# Patient Record
Sex: FEMALE | URBAN - METROPOLITAN AREA
[De-identification: names, ages, dates, MRNs, and addresses within clinical notes are randomized per-mention and may not be internally consistent; named-entity substitution may affect disease eponyms.]

---

## 2018-03-19 ENCOUNTER — HOSPITAL ENCOUNTER (OUTPATIENT)
Facility: CLINIC | Age: 38
End: 2018-03-19
Attending: OBSTETRICS & GYNECOLOGY | Admitting: OBSTETRICS & GYNECOLOGY
Payer: COMMERCIAL

## 2022-09-20 ENCOUNTER — HOSPITAL ENCOUNTER (OUTPATIENT)
Dept: MAMMOGRAPHY | Facility: CLINIC | Age: 42
Discharge: HOME | End: 2022-09-20
Attending: OBSTETRICS & GYNECOLOGY
Payer: COMMERCIAL

## 2022-09-20 DIAGNOSIS — R92.8 ABNORMAL MAMMOGRAM: ICD-10-CM

## 2022-09-20 PROCEDURE — 76642 ULTRASOUND BREAST LIMITED: CPT | Mod: 26 | Performed by: STUDENT IN AN ORGANIZED HEALTH CARE EDUCATION/TRAINING PROGRAM

## 2022-09-20 PROCEDURE — 76642 ULTRASOUND BREAST LIMITED: CPT | Mod: LT

## 2023-08-30 ENCOUNTER — LAB REQUISITION (OUTPATIENT)
Dept: LAB | Facility: CLINIC | Age: 43
End: 2023-08-30
Payer: COMMERCIAL

## 2023-08-30 DIAGNOSIS — Z11.3 ENCOUNTER FOR SCREENING FOR INFECTIONS WITH A PREDOMINANTLY SEXUAL MODE OF TRANSMISSION: ICD-10-CM

## 2023-08-30 DIAGNOSIS — Z13.220 ENCOUNTER FOR SCREENING FOR LIPOID DISORDERS: ICD-10-CM

## 2023-08-30 DIAGNOSIS — Z13.228 ENCOUNTER FOR SCREENING FOR OTHER METABOLIC DISORDERS: ICD-10-CM

## 2023-08-30 DIAGNOSIS — Z13.9 ENCOUNTER FOR SCREENING, UNSPECIFIED: ICD-10-CM

## 2023-08-30 LAB
ALBUMIN SERPL BCG-MCNC: 4.5 G/DL (ref 3.5–5.2)
ALP SERPL-CCNC: 71 U/L (ref 35–104)
ALT SERPL W P-5'-P-CCNC: 11 U/L (ref 0–50)
ANION GAP SERPL CALCULATED.3IONS-SCNC: 13 MMOL/L (ref 7–15)
AST SERPL W P-5'-P-CCNC: 37 U/L (ref 0–45)
BASOPHILS # BLD AUTO: 0 10E3/UL (ref 0–0.2)
BASOPHILS NFR BLD AUTO: 0 %
BILIRUB SERPL-MCNC: 0.6 MG/DL
BUN SERPL-MCNC: 10.2 MG/DL (ref 6–20)
CALCIUM SERPL-MCNC: 9.6 MG/DL (ref 8.6–10)
CHLORIDE SERPL-SCNC: 101 MMOL/L (ref 98–107)
CHOLEST SERPL-MCNC: 197 MG/DL
CREAT SERPL-MCNC: 0.89 MG/DL (ref 0.51–0.95)
DEPRECATED HCO3 PLAS-SCNC: 23 MMOL/L (ref 22–29)
EOSINOPHIL # BLD AUTO: 0.4 10E3/UL (ref 0–0.7)
EOSINOPHIL NFR BLD AUTO: 4 %
ERYTHROCYTE [DISTWIDTH] IN BLOOD BY AUTOMATED COUNT: 13.4 % (ref 10–15)
GFR SERPL CREATININE-BSD FRML MDRD: 83 ML/MIN/1.73M2
GLUCOSE SERPL-MCNC: 85 MG/DL (ref 70–99)
HBA1C MFR BLD: 5.5 %
HCT VFR BLD AUTO: 40.5 % (ref 35–47)
HDLC SERPL-MCNC: 59 MG/DL
HGB BLD-MCNC: 13.4 G/DL (ref 11.7–15.7)
IMM GRANULOCYTES # BLD: 0 10E3/UL
IMM GRANULOCYTES NFR BLD: 0 %
LDLC SERPL CALC-MCNC: 125 MG/DL
LYMPHOCYTES # BLD AUTO: 1.8 10E3/UL (ref 0.8–5.3)
LYMPHOCYTES NFR BLD AUTO: 21 %
MCH RBC QN AUTO: 32.8 PG (ref 26.5–33)
MCHC RBC AUTO-ENTMCNC: 33.1 G/DL (ref 31.5–36.5)
MCV RBC AUTO: 99 FL (ref 78–100)
MONOCYTES # BLD AUTO: 0.6 10E3/UL (ref 0–1.3)
MONOCYTES NFR BLD AUTO: 7 %
NEUTROPHILS # BLD AUTO: 5.7 10E3/UL (ref 1.6–8.3)
NEUTROPHILS NFR BLD AUTO: 68 %
NONHDLC SERPL-MCNC: 138 MG/DL
NRBC # BLD AUTO: 0 10E3/UL
NRBC BLD AUTO-RTO: 0 /100
PLATELET # BLD AUTO: 279 10E3/UL (ref 150–450)
POTASSIUM SERPL-SCNC: 3.8 MMOL/L (ref 3.4–5.3)
PROT SERPL-MCNC: 8.1 G/DL (ref 6.4–8.3)
RBC # BLD AUTO: 4.08 10E6/UL (ref 3.8–5.2)
SODIUM SERPL-SCNC: 137 MMOL/L (ref 136–145)
TRIGL SERPL-MCNC: 65 MG/DL
TSH SERPL DL<=0.005 MIU/L-ACNC: 1.29 UIU/ML (ref 0.3–4.2)
WBC # BLD AUTO: 8.5 10E3/UL (ref 4–11)

## 2023-08-30 PROCEDURE — 80061 LIPID PANEL: CPT | Mod: ORL | Performed by: OBSTETRICS & GYNECOLOGY

## 2023-08-30 PROCEDURE — 83036 HEMOGLOBIN GLYCOSYLATED A1C: CPT | Mod: ORL | Performed by: OBSTETRICS & GYNECOLOGY

## 2023-08-30 PROCEDURE — 85025 COMPLETE CBC W/AUTO DIFF WBC: CPT | Mod: ORL | Performed by: OBSTETRICS & GYNECOLOGY

## 2023-08-30 PROCEDURE — 87389 HIV-1 AG W/HIV-1&-2 AB AG IA: CPT | Mod: ORL | Performed by: OBSTETRICS & GYNECOLOGY

## 2023-08-30 PROCEDURE — 84443 ASSAY THYROID STIM HORMONE: CPT | Mod: ORL | Performed by: OBSTETRICS & GYNECOLOGY

## 2023-08-30 PROCEDURE — 80053 COMPREHEN METABOLIC PANEL: CPT | Mod: ORL | Performed by: OBSTETRICS & GYNECOLOGY

## 2023-08-31 LAB — HIV 1+2 AB+HIV1 P24 AG SERPL QL IA: NONREACTIVE

## 2023-12-20 ENCOUNTER — LAB REQUISITION (OUTPATIENT)
Dept: LAB | Facility: CLINIC | Age: 43
End: 2023-12-20
Payer: COMMERCIAL

## 2023-12-20 DIAGNOSIS — N89.8 OTHER SPECIFIED NONINFLAMMATORY DISORDERS OF VAGINA: ICD-10-CM

## 2023-12-20 DIAGNOSIS — E66.9 OBESITY, UNSPECIFIED: ICD-10-CM

## 2023-12-20 PROCEDURE — 83036 HEMOGLOBIN GLYCOSYLATED A1C: CPT | Mod: ORL | Performed by: OBSTETRICS & GYNECOLOGY

## 2023-12-20 PROCEDURE — 87086 URINE CULTURE/COLONY COUNT: CPT | Mod: ORL | Performed by: OBSTETRICS & GYNECOLOGY

## 2023-12-21 LAB
BACTERIA UR CULT: NO GROWTH
HBA1C MFR BLD: 5.7 %

## 2024-10-23 ENCOUNTER — LAB REQUISITION (OUTPATIENT)
Dept: LAB | Facility: CLINIC | Age: 44
End: 2024-10-23
Payer: COMMERCIAL

## 2024-10-23 DIAGNOSIS — Z13.228 ENCOUNTER FOR SCREENING FOR OTHER METABOLIC DISORDERS: ICD-10-CM

## 2024-10-23 DIAGNOSIS — Z12.4 ENCOUNTER FOR SCREENING FOR MALIGNANT NEOPLASM OF CERVIX: ICD-10-CM

## 2024-10-23 DIAGNOSIS — R73.03 PREDIABETES: ICD-10-CM

## 2024-10-23 DIAGNOSIS — Z13.9 ENCOUNTER FOR SCREENING, UNSPECIFIED: ICD-10-CM

## 2024-10-23 DIAGNOSIS — Z13.220 ENCOUNTER FOR SCREENING FOR LIPOID DISORDERS: ICD-10-CM

## 2024-10-23 LAB
BASOPHILS # BLD AUTO: 0 10E3/UL (ref 0–0.2)
BASOPHILS NFR BLD AUTO: 1 %
EOSINOPHIL # BLD AUTO: 0.2 10E3/UL (ref 0–0.7)
EOSINOPHIL NFR BLD AUTO: 3 %
ERYTHROCYTE [DISTWIDTH] IN BLOOD BY AUTOMATED COUNT: 13.4 % (ref 10–15)
EST. AVERAGE GLUCOSE BLD GHB EST-MCNC: 120 MG/DL
HBA1C MFR BLD: 5.8 %
HCT VFR BLD AUTO: 41.3 % (ref 35–47)
HGB BLD-MCNC: 13.9 G/DL (ref 11.7–15.7)
IMM GRANULOCYTES # BLD: 0 10E3/UL
IMM GRANULOCYTES NFR BLD: 0 %
LYMPHOCYTES # BLD AUTO: 2.5 10E3/UL (ref 0.8–5.3)
LYMPHOCYTES NFR BLD AUTO: 40 %
MCH RBC QN AUTO: 33.4 PG (ref 26.5–33)
MCHC RBC AUTO-ENTMCNC: 33.7 G/DL (ref 31.5–36.5)
MCV RBC AUTO: 99 FL (ref 78–100)
MONOCYTES # BLD AUTO: 0.5 10E3/UL (ref 0–1.3)
MONOCYTES NFR BLD AUTO: 9 %
NEUTROPHILS # BLD AUTO: 3 10E3/UL (ref 1.6–8.3)
NEUTROPHILS NFR BLD AUTO: 48 %
NRBC # BLD AUTO: 0 10E3/UL
NRBC BLD AUTO-RTO: 0 /100
PLATELET # BLD AUTO: 238 10E3/UL (ref 150–450)
RBC # BLD AUTO: 4.16 10E6/UL (ref 3.8–5.2)
WBC # BLD AUTO: 6.3 10E3/UL (ref 4–11)

## 2024-10-23 PROCEDURE — 80061 LIPID PANEL: CPT | Mod: ORL | Performed by: OBSTETRICS & GYNECOLOGY

## 2024-10-23 PROCEDURE — 83036 HEMOGLOBIN GLYCOSYLATED A1C: CPT | Mod: ORL | Performed by: OBSTETRICS & GYNECOLOGY

## 2024-10-23 PROCEDURE — 85025 COMPLETE CBC W/AUTO DIFF WBC: CPT | Mod: ORL | Performed by: OBSTETRICS & GYNECOLOGY

## 2024-10-23 PROCEDURE — 84443 ASSAY THYROID STIM HORMONE: CPT | Mod: ORL | Performed by: OBSTETRICS & GYNECOLOGY

## 2024-10-23 PROCEDURE — 80053 COMPREHEN METABOLIC PANEL: CPT | Mod: ORL | Performed by: OBSTETRICS & GYNECOLOGY

## 2024-10-23 PROCEDURE — 83001 ASSAY OF GONADOTROPIN (FSH): CPT | Mod: ORL | Performed by: OBSTETRICS & GYNECOLOGY

## 2024-10-24 LAB
ALBUMIN SERPL BCG-MCNC: 4.3 G/DL (ref 3.5–5.2)
ALP SERPL-CCNC: 96 U/L (ref 40–150)
ALT SERPL W P-5'-P-CCNC: 39 U/L (ref 0–50)
ANION GAP SERPL CALCULATED.3IONS-SCNC: 13 MMOL/L (ref 7–15)
AST SERPL W P-5'-P-CCNC: 59 U/L (ref 0–45)
BILIRUB SERPL-MCNC: 0.3 MG/DL
BUN SERPL-MCNC: 7.4 MG/DL (ref 6–20)
CALCIUM SERPL-MCNC: 9.2 MG/DL (ref 8.8–10.4)
CHLORIDE SERPL-SCNC: 103 MMOL/L (ref 98–107)
CHOLEST SERPL-MCNC: 186 MG/DL
CREAT SERPL-MCNC: 0.78 MG/DL (ref 0.51–0.95)
EGFRCR SERPLBLD CKD-EPI 2021: >90 ML/MIN/1.73M2
FASTING STATUS PATIENT QL REPORTED: ABNORMAL
FSH SERPL IRP2-ACNC: 4.5 MIU/ML
GLUCOSE SERPL-MCNC: 108 MG/DL (ref 70–99)
HCO3 SERPL-SCNC: 23 MMOL/L (ref 22–29)
HDLC SERPL-MCNC: 56 MG/DL
HPV HR 12 DNA CVX QL NAA+PROBE: NEGATIVE
HPV16 DNA CVX QL NAA+PROBE: NEGATIVE
HPV18 DNA CVX QL NAA+PROBE: NEGATIVE
HUMAN PAPILLOMA VIRUS FINAL DIAGNOSIS: NORMAL
LDLC SERPL CALC-MCNC: 113 MG/DL
NONHDLC SERPL-MCNC: 130 MG/DL
POTASSIUM SERPL-SCNC: 3.8 MMOL/L (ref 3.4–5.3)
PROT SERPL-MCNC: 8 G/DL (ref 6.4–8.3)
SODIUM SERPL-SCNC: 139 MMOL/L (ref 135–145)
TRIGL SERPL-MCNC: 87 MG/DL
TSH SERPL DL<=0.005 MIU/L-ACNC: 1.3 UIU/ML (ref 0.3–4.2)

## 2024-10-29 LAB
BKR AP ASSOCIATED HPV REPORT: NORMAL
BKR LAB AP GYN ADEQUACY: NORMAL
BKR LAB AP GYN INTERPRETATION: NORMAL
BKR LAB AP LMP: NORMAL
BKR LAB AP PREVIOUS ABNL DX: NORMAL
BKR LAB AP PREVIOUS ABNORMAL: NORMAL
PATH REPORT.COMMENTS IMP SPEC: NORMAL
PATH REPORT.COMMENTS IMP SPEC: NORMAL
PATH REPORT.RELEVANT HX SPEC: NORMAL

## 2025-01-07 ENCOUNTER — LAB REQUISITION (OUTPATIENT)
Dept: LAB | Facility: CLINIC | Age: 45
End: 2025-01-07
Payer: COMMERCIAL

## 2025-01-07 DIAGNOSIS — F10.20 ALCOHOL DEPENDENCE, UNCOMPLICATED (H): ICD-10-CM

## 2025-01-07 LAB
ALT SERPL W P-5'-P-CCNC: 25 U/L (ref 0–50)
AST SERPL W P-5'-P-CCNC: 90 U/L (ref 0–45)
GGT SERPL-CCNC: 165 U/L (ref 5–36)

## 2025-01-07 PROCEDURE — 84460 ALANINE AMINO (ALT) (SGPT): CPT | Mod: ORL | Performed by: OBSTETRICS & GYNECOLOGY

## 2025-01-07 PROCEDURE — 82977 ASSAY OF GGT: CPT | Mod: ORL | Performed by: OBSTETRICS & GYNECOLOGY

## 2025-01-07 PROCEDURE — 84450 TRANSFERASE (AST) (SGOT): CPT | Mod: ORL | Performed by: OBSTETRICS & GYNECOLOGY

## 2025-05-17 ENCOUNTER — HOSPITAL ENCOUNTER (EMERGENCY)
Facility: CLINIC | Age: 45
Discharge: HOME | End: 2025-05-17
Attending: EMERGENCY MEDICINE | Admitting: EMERGENCY MEDICINE
Payer: COMMERCIAL

## 2025-05-17 VITALS
OXYGEN SATURATION: 97 % | BODY MASS INDEX: 32.17 KG/M2 | RESPIRATION RATE: 16 BRPM | HEIGHT: 66 IN | DIASTOLIC BLOOD PRESSURE: 87 MMHG | WEIGHT: 200.2 LBS | SYSTOLIC BLOOD PRESSURE: 138 MMHG | HEART RATE: 111 BPM | TEMPERATURE: 98.8 F

## 2025-05-17 DIAGNOSIS — F33.1 MAJOR DEPRESSIVE DISORDER, RECURRENT EPISODE, MODERATE (H): Primary | ICD-10-CM

## 2025-05-17 DIAGNOSIS — F41.1 GAD (GENERALIZED ANXIETY DISORDER): ICD-10-CM

## 2025-05-17 DIAGNOSIS — R11.0 NAUSEA: ICD-10-CM

## 2025-05-17 DIAGNOSIS — F33.2 SEVERE EPISODE OF RECURRENT MAJOR DEPRESSIVE DISORDER, WITHOUT PSYCHOTIC FEATURES (H): ICD-10-CM

## 2025-05-17 PROCEDURE — 250N000011 HC RX IP 250 OP 636: Performed by: EMERGENCY MEDICINE

## 2025-05-17 PROCEDURE — 99285 EMERGENCY DEPT VISIT HI MDM: CPT

## 2025-05-17 PROCEDURE — 99284 EMERGENCY DEPT VISIT MOD MDM: CPT

## 2025-05-17 RX ORDER — ONDANSETRON 4 MG/1
4 TABLET, ORALLY DISINTEGRATING ORAL EVERY 6 HOURS PRN
Status: DISCONTINUED | OUTPATIENT
Start: 2025-05-17 | End: 2025-05-18 | Stop reason: HOSPADM

## 2025-05-17 RX ORDER — MULTIVITAMIN WITH IRON
1 TABLET ORAL AT BEDTIME
COMMUNITY

## 2025-05-17 RX ORDER — TIMOLOL MALEATE 5 MG/ML
1 SOLUTION/ DROPS OPHTHALMIC
COMMUNITY
Start: 2025-03-11

## 2025-05-17 RX ORDER — ESCITALOPRAM OXALATE 5 MG/1
TABLET ORAL
Qty: 21 TABLET | Refills: 0 | Status: SHIPPED | OUTPATIENT
Start: 2025-05-17 | End: 2025-05-31

## 2025-05-17 RX ORDER — ALPRAZOLAM 0.5 MG
0.5 TABLET ORAL 3 TIMES DAILY PRN
COMMUNITY
End: 2025-05-17

## 2025-05-17 RX ORDER — LACTOBACILLUS RHAMNOSUS GG 10B CELL
1 CAPSULE ORAL DAILY
COMMUNITY

## 2025-05-17 RX ORDER — HYDROXYZINE HYDROCHLORIDE 25 MG/1
25-50 TABLET, FILM COATED ORAL 2 TIMES DAILY PRN
Qty: 20 TABLET | Refills: 0 | Status: SHIPPED | OUTPATIENT
Start: 2025-05-17

## 2025-05-17 RX ORDER — CITALOPRAM HYDROBROMIDE 20 MG/1
20 TABLET ORAL DAILY
COMMUNITY
End: 2025-05-17

## 2025-05-17 RX ADMIN — ONDANSETRON 4 MG: 4 TABLET, ORALLY DISINTEGRATING ORAL at 14:52

## 2025-05-17 RX ADMIN — ONDANSETRON 4 MG: 4 TABLET, ORALLY DISINTEGRATING ORAL at 22:28

## 2025-05-17 ASSESSMENT — ACTIVITIES OF DAILY LIVING (ADL)
ADLS_ACUITY_SCORE: 41

## 2025-05-17 ASSESSMENT — COLUMBIA-SUICIDE SEVERITY RATING SCALE - C-SSRS
6. HAVE YOU EVER DONE ANYTHING, STARTED TO DO ANYTHING, OR PREPARED TO DO ANYTHING TO END YOUR LIFE?: NO
2. HAVE YOU ACTUALLY HAD ANY THOUGHTS OF KILLING YOURSELF IN THE PAST MONTH?: NO
1. IN THE PAST MONTH, HAVE YOU WISHED YOU WERE DEAD OR WISHED YOU COULD GO TO SLEEP AND NOT WAKE UP?: NO

## 2025-05-17 NOTE — ED PROVIDER NOTES
"  Emergency Department Note      History of Present Illness     Chief Complaint   Psychiatric Evaluation      HPI   Angeli Blancas is a 44 year old female who arrives to the emergency department due to worsening depression and anxiety.  She takes citalopram for depression.  She states she has been having passive suicidal ideations that if she does not start to feel better with her depression she may develop a suicidal thought, but denies any plan.  She denies any homicidal thoughts.  She denies any hallucinations.  She denies any recent overdose or suicide attempt.  She has been nauseated today which she feels is due to her anxiety but no other medical concerns.  She has been able to eat..  She denies chest pain, shortness of breath, vomiting, diarrhea, abdominal pain, fevers, chills, coughing.    Independent Historian   The patient has 2 friends with her as detailed above.    Review of External Notes   none    Past Medical History     Medical History and Problem List   Asthma   Polycystic ovarian disease     Medications   Albuterol inhaler   Alprazolam   Citalopram   Naltrexone   Wegovy     Surgical History   C section     Physical Exam     Patient Vitals for the past 24 hrs:   BP Temp Temp src Pulse Resp SpO2 Height Weight   05/17/25 1428 -- 98.3  F (36.8  C) Temporal -- -- -- -- --   05/17/25 1427 (!) 145/98 -- Temporal (!) 122 16 95 % 1.676 m (5' 6\") 74.8 kg (165 lb)     Physical Exam  Nursing note and vitals reviewed.  Constitutional:  Appears well-developed and well-nourished.   HENT:   Head:    Atraumatic.   Mouth/Throat:   Oropharynx is clear and moist. No oropharyngeal exudate.   Eyes:    Pupils are equal, round, and reactive to light.   Neck:    Normal range of motion. Neck supple.      No tracheal deviation present. No thyromegaly present.   Cardiovascular:  Normal rate, regular rhythm, no murmur   Pulmonary/Chest: Breath sounds are clear and equal without wheezes or crackles.  Abdominal:   Soft. Bowel " sounds are normal. Exhibits no distension and      no mass. There is no tenderness.      There is no rebound and no guarding.   Musculoskeletal:  Exhibits no edema.   Lymphadenopathy:  No cervical adenopathy.   Psych:                         She is calm and cooperative.  Very pleasant.  Neurological:   Alert and oriented to person, place, and time.   Skin:    Skin is warm and dry. No rash noted. No pallor.      Diagnostics     Lab Results   Labs Ordered and Resulted from Time of ED Arrival to Time of ED Departure - No data to display    Imaging   No orders to display       Independent Interpretation   None    ED Course      Medications Administered   Medications   ondansetron (ZOFRAN ODT) ODT tab 4 mg (has no administration in time range)       Procedures   Procedures     Discussion of Management   None    ED Course        Additional Documentation  None    Medical Decision Making / Diagnosis     CMS Diagnoses: None    MIPS   None               MDM   Angeli Blancas is a 44 year old female who arrives to the emergency department due to worsening depression and anxiety.  She has very passive suicidal ideation but no plan or attempt.  She is nauseated which she feels is due to her anxiety so she was given Zofran.  I did not feel there was any serious intra-abdominal process such as appendicitis, cholecystitis, diverticulitis, intra-abdominal infection, bowel obstruction or cardiac problem and she denies any vomiting, diarrhea, fever or abdominal pain.  She appears well-hydrated.  I felt she was medically cleared and she was sent to [facility] for further mental health evaluation.    Disposition   The patient was transferred to [facility].     Diagnosis     ICD-10-CM    1. Passive suicidal ideations  R45.851       2. Acute depression  F32.A       3. Nausea  R11.0            Scribe Disclosure:  Obie JANG, am serving as a scribe at 2:49 PM on 5/17/2025 to document services personally performed by Ela Fernandez MD  based on my observations and the provider's statements to me.        Ela Fernandez MD  05/17/25 [id number]

## 2025-05-17 NOTE — ED TRIAGE NOTES
Worsening depression, no SI.  On citalopram for depression.     Triage Assessment (Adult)       Row Name 05/17/25 1427          Triage Assessment    Airway WDL WDL        Respiratory WDL    Respiratory WDL WDL        Skin Circulation/Temperature WDL    Skin Circulation/Temperature WDL WDL        Cardiac WDL    Cardiac WDL WDL        Peripheral/Neurovascular WDL    Peripheral Neurovascular WDL WDL        Cognitive/Neuro/Behavioral WDL    Cognitive/Neuro/Behavioral WDL WDL

## 2025-05-17 NOTE — DISCHARGE INSTRUCTIONS
Patient Navigation Hub - Scheduled Appointment    You have been scheduled a telephone appointment with the Mental Health and Addiction Services Patient Navigation Hub. As a reminder, this is not an in-person appointment. A Navigator will contact you at your personal telephone number on 5/19 at 2pm. You can expect a 15-30 minute appointment. You will discuss programming options and be assisted in next steps. If you have any further questions or concerns, please contact the Patient Navigation Hub at [phone]. Note: You will not be charged for this telephone appointment.    Our Navigators work to be your point-of-contact for trustworthy and compassionate care from Emergency Services to [facility] s Programmatic Care. We will provide resources and communication to help guide you into programmatic care, other internal resources (I.e., Transition Clinic), and/or community programs. Ultimately, our goal is to be the one-stop-shop of communication, coordination, and support for you.    Phone: [phone]  Email: [email or web address]  Fax: [phone]    [facility] Programmatic Care  The following is a list of our programming options that may fit your next steps:    Programs  Mental Health  Intensive Outpatient Program (IOP)  Partial Hospitalization Program (PHP)  Day Treatment  Substance Use Disorder  Intensive Outpatient Program  Outpatient Group  Mental Health & Substance Use Disorder  Co-Occurring Intensive Outpatient Program  Residential  Available Locations  [facility], [place], [place], [place]  Note: Specific program options vary by location.    Transition Clinic  After leaving Emergency Services, it may take up to 30 days to enter a program. Knowing support is important during this period of time, we offer an urgent model of mental health care via the Transition Clinic. We encourage you to discuss this with your Navigator during  your telephone appointment.    FAQ  What can I expect after leaving Emergency Services?  If not already, you will soon be contacted by a Navigator who will work with you to find a program that fits your needs. If you desire to enter one of our [facility] programs, you will enter our programmatic care intake where an assessment will likely be needed over telephone, virtually, or in-person. After this assessment, a Navigator will connect with you again to provide a handoff to the specific program for next steps.   Please note that it may take up to 30 days for you to begin a program. If an extended wait occurs, please consider services at the Transition Clinic.  What is programmatic care?  It is a highly structured and comprehensive approach designed to address mental health and substance use disorders.   Programmatic care is typically used when individuals have not responded well to less intensive treatments or when they require a higher level of intervention due to the severity of their condition. It can be found in various settings, such as an outpatient location, residential treatment facilities, or inpatient hospitals.  Each program varies in duration and weekly commitments. Ask a Navigator for more program details.     -------------------  Type: Therapy - (In-Person)  Date: Tuesday, 5/27/2025    Time: 11:00 am - 12:00 pm  Provider: Phyllis Vera MA  [facility]  Location: [facility], [address] [place] [address] [address], [place] [address]  Phone: [phone]    Patient instructions  Please arrive 30 minutes early to complete intake paperwork.    [email or web address]    ----------------  Type: Medication Mgmt - (In-Person)  Date: Thursday, 5/29/2025    Time: 10:00 am - 11:00 am  Provider: Corine AGUILERA  CNP,PMHNP  Location: [address] [address]  Phone: [phone]    Patient instructions  Before your appointment, you must speak with  our Intake Department. Our intake team will attempt to contact you. If you do not hear from them within 24 hours, please call them at [phone] and tell them you are a P referral. If you do not speak with our Intake Department and complete the necessary paperwork they send you, we cannot see you at your scheduled appointment time.    [email or web address]

## 2025-05-17 NOTE — ED NOTES
[facility]  ED to [facility] Checklist:      Goal for [facility]: Depression management    Current Behavior: Calm and Cooperative    Safety Concerns: Suicidal, no plan    Legal Hold Status: Voluntary    Medically Cleared by ED provider: Yes    Patient Therapeutically Searched:Not currently searched, patient in triage     Belongings: Remain with patient    Independent Ambulation at Baseline: Yes/No: Yes    Participates in Care/Conversation: Yes/No: Yes    Patient Informed about [facility]: Yes/No: Yes    DEC: Ordered and pending    Patient Ready to be Transferred to E[facility]? Yes/No: Yes

## 2025-05-18 ENCOUNTER — TELEPHONE (OUTPATIENT)
Dept: BEHAVIORAL HEALTH | Facility: CLINIC | Age: 45
End: 2025-05-18
Payer: COMMERCIAL

## 2025-05-18 NOTE — ED NOTES
"Angeli \"Verónica\" is a 44 year old female with no psychiatric history. Today she was received from ED due to increasing depression. Reports she drinks 5-6 drinks every Saturday and Sunday when her kids are visiting their father. Patient denies SI/HI and contracts for safety.     Nursing and risk assessments completed. Assessments reviewed with [facility]HP and physician. Admission information reviewed with patient. Patient given a tour of [facility] and instructions on using the facility. Questions regarding [facility] addressed. Pt safety search completed.     "

## 2025-05-18 NOTE — ED PROVIDER NOTES
"[facility] Unit - Psychiatric Consultation  [facility] Emergency Department    Angeli Blancas MRN: [id number]   Age: 44 year old YOB: 1980     History     Chief Complaint   Patient presents with    Psychiatric Evaluation     H[facility]  [facility] MIGUEL ÁNGEL Blancas is a 44 year old female with history notable for depression, trauma, and anxiety who presented to the emergency department with concerns for worsening depression and anxiety.  Patient reports that her friends called [facility] on Tuesday who came to assess her however she did not seek additional mental health supports due to work and childcare needs.  She subsequently came to the emergency department with her friends today seeking additional supports. In the emergency department, this patient was determined to be medically stable and transferred to the [facility] unit for psychiatric assessment.  Patient denies any previous inpatient mental health hospitalizations and denies any previous suicide attempts.  They have currently been in the emergency department for 7.5 hours.     I have reviewed the DEC assessment complete by LUÍS Borja, Psychotherapist  dated 5/17/25.    On examination, patient reports steadily worsening symptoms of depression and anxiety for the past 5 months however these have become more prominent over the past 1 to 2 months.  She endorses increasing isolation, social withdrawal, apathy, insomnia, and fatigue.  She notes a poor appetite however reports this has been chronic and longstanding often eating only 1 full meal a day.  At times she endorses ruminating thoughts and periods of elevated anxiety.  She denies SI/HI and there is no evidence of psychosis or nancy.  She states that she no longer wants to continue feeling this way thus she came to the emergency department seeking additional outpatient mental health supports.  Additionally, she reports drinking 5 to 6 drinks a night usually 1-2 evenings a week when she is not caring " "for her children.  Denies any previous substance use disorder treatment and denies previous or current symptoms of withdrawal.  She recalls previous medication trials of Celexa for the past 2.5 years however at times has been inconsistent with taking this.  She has not taken this for approximately the past 1 to 2 months.  She is interested in additional medications further targeting depression and anxiety as she does not feel as though Celexa ever provided full remission of her symptoms.  Discussed trial of Lexapro in addition to as needed hydroxyzine which she is agreeable to.  She is interested in referrals for psychiatric medication management, therapy, and programmatic care.  She perceives readiness to discharge following the conclusion of our assessment noting that she would like to return home to see her children tomorrow.       Past Medical History  No past medical history on file.  No past surgical history on file.  cholecalciferol (VITAMIN D3) 25 mcg (1000 units) capsule  escitalopram (LEXAPRO) 5 MG tablet  hydrOXYzine HCl (ATARAX) 25 MG tablet  lactobacillus rhamnosus, GG, (CULTURELL) capsule  magnesium 250 MG tablet  VIENVA 0.1-20 MG-MCG tablet      No Known Allergies  Family History  No family history on file.  Social History           Review of Systems  A medically appropriate review of systems was performed with pertinent positives and negatives noted in the HPI, and all other systems negative.    Physical Examination     BP: (!) 145/98  Pulse: (!) 122  Temp: 98.3  F (36.8  C)  Resp: 16  Height: 167.6 cm (5' 6\")  Weight: 74.8 kg (165 lb)  SpO2: 95 %    Physical Exam  General: Appears stated age.   Neuro: Alert and fully oriented. Extremities appear to demonstrate normal strength on visual inspection.   Integumentary/Skin: no rash visualized, normal color    Psychiatric Examination     Appearance: awake, alert, adequately groomed, dressed in hospital scrubs, and appeared as age stated  Attitude:  " cooperative  Eye Contact:  good  Mood:  anxious and depressed  Affect:  mood congruent and intensity is flat  Speech:  clear, coherent and normal prosody  Psychomotor Behavior:  no evidence of tardive dyskinesia, dystonia, or tics and intact station, gait and muscle tone  Thought Process:  logical and linear  Associations:  no loose associations  Thought Content:  no evidence of suicidal ideation or homicidal ideation and no evidence of psychotic thought  Insight:  fair  Judgement:  fair  Oriented to:  time, person, and place  Attention Span and Concentration:  intact  Recent and Remote Memory:  intact  Language: able to name/identify objects without impairment  Fund of Knowledge: intact with awareness of current and past events    ED Course          Labs Ordered and Resulted from Time of ED Arrival to Time of ED Departure - No data to display    Assessments & Plan (with Medical Decision Making)     Patient presenting with worsening depression and anxiety further exacerbated by psychosocial stressors and inconsistency with medication management.  Patient is seeking additional outpatient mental health supports and medications further targeting current symptoms.  Patient does report previous trial of Celexa with limited perceived benefit however also acknowledges at times medication inconsistency was likely contributing to this.  Patient does report drinking 1-2 evenings a week, discussed how this could further exacerbate and/or mask symptoms.  Patient denies any symptoms of withdrawal and denies any previous substance use disorder treatment.  Advised to reduce and refrain from substance use.  Treatment plan focused on initiation of Lexapro further targeting depression and anxiety in addition to as needed hydroxyzine.  Patient would like referrals for therapy, psychiatric medication management and programmatic care.. Nursing notes reviewed noting no acute issues.     I have reviewed the assessment completed by the  [facility].     Preliminary diagnosis:    ICD-10-CM    1. Major depressive disorder, recurrent episode, moderate (H)  F33.1 Adult Mental Health  Referral      2. Nausea  R11.0       3. Severe episode of recurrent major depressive disorder, without psychotic features (H)  F33.2       4. ALEXI (generalized anxiety disorder)  F41.1            Treatment Plan:  -Stop Celexa given recent inconsistency with medication adherence and lack of perceived symptom remission  - Start Lexapro 5 mg daily further targeting depression and anxiety for 7 days then increase to 10 mg daily.  Follow-up with outpatient psychiatric medication provider for additional dose optimization  - Start hydroxyzine 25 to 50 mg twice daily as needed for periods of elevated anxiety   - Medication education provided this visit including but not limited to: Rationale for medication, importance of medication adherence, medication interactions, common medication side effects, benefits of medications.  - Recommendation to abstain from substance use   - Patient will need referral for psychiatric medication management and therapy upon discharge  - Patient would benefit from programmatic care, referral placed   - Problem focused supportive therapy and education provided today related to patient's current and acute stressors, symptoms, and diagnoses.  -  Discharge from [facility] with OP and crisis supports.  At time of assessment, patient denies any active suicidal ideation and there are no acute safety concerns.  Patient does not warrant inpatient mental health hospitalization and can be further stabilized through outpatient levels of care.     --  RAFAELA Flaherty CNP   [facility] EMERGENCY DEPT  [facility] Unit      Ophelia Kilpatrick APRN CNP  05/17/25 [id number]

## 2025-05-18 NOTE — ED NOTES
Patient escorted off the unit at 2300 accompanied by [facility] staff. Discharged to home via cab. Mood appeared stable with a content outlook. Patient agreed to discharge plan. Discharge instructions reviewed with patient including follow-up care plan. Two new medications sent to [facility] in [place]: Atarax & Lexapro. Reviewed safety plan and outpatient resources. Denied SI and HI. All belongings that were brought into the hospital have been returned to patient including cell phone, purse, clothing and shoes.

## 2025-05-18 NOTE — TELEPHONE ENCOUNTER
Called Patient to speak with them about the possible need for further appointments and resources.     patient requested a med management appt. Scheduled via [employer] connect and emailed patient details of appt.

## 2025-05-18 NOTE — CONSULTS
"Diagnostic Evaluation Consultation  Crisis Assessment    Patient Name: Angeli Blancas  Age:  44 year old  Legal Sex: female  Gender Identity: female  Race: Black or   Ethnicity: Not  or   Language: English      Patient was assessed: Virtual: iPad   Crisis Assessment Start Date: 05/17/25  Crisis Assessment Start Time: 1817  Crisis Assessment Stop Time: 1837  Patient location: [facility] Emergency Dept                             [id number]    Referral Data and Chief Complaint  Angeli Blancas presents to the ED by  self. Patient is presenting to the ED for the following concerns: Depression, Anxiety. Factors that make the mental health crisis life threatening or complex are: Pt presents to the ED this evening for worsening anxiety and depression at the recommendation of [facility]. Upon assessment, pt tells this writer that she has been increasingly depressed over the last year, coinciding with her therapist of 2.5 years moving to a different position. Pt has been unable to find similar rapport with another therapist. When asked about contributing stressors, pt tells this writer that she had dated a man who was part of former [employer]. This man betrayed her and she wrote a novel documenting the experience, which later went viral. When it went viral, pt lost control over the narrative and was portrayed in a negative light, which has deeply harmed her sense of self. This has reactivated pt's childhood trauma around her father having two children outside of his marriage to her mother. Pt has been coping with these recent events by drinking alcohol. She drinks 5-6 drinks on Fridays and Saturday to fall asleep and does not sleep well the other nights, often only sleeping between 12-3 am. She averages 1 gisele/day and notes that she has \"always been a bad eater\". She denies NSSI, SI, or HI. She would like assistance connecting to a therapist and " psychiatrist. She is currently taking Celexa prescribed by her OBGYN..      Informed Consent and Assessment Methods  Explained the crisis assessment process, including applicable information disclosures and limits to confidentiality, assessed understanding of the process, and obtained consent to proceed with the assessment.  Assessment methods included conducting a formal interview with patient, review of medical records, collaboration with medical staff, and obtaining relevant collateral information from family and community providers when available.  : done     History of the Crisis   Pt tells this writer that she has not had a therapist for a year but was consistently seeing a therapist for 2.5 years prior to that. She endorses a history of childhood trauma and says that she was processing this in therapy. She denies a history of NSSI, suicide attempts, or inpatient psychiatric hospitalizations. She acknowledges that her current alcohol use is problematic.    Brief Psychosocial History  Family:  , Children yes (Pt has an 11-year-old and 9-year-old twins.)  Support System:  Other (specify) (ex-)  Employment Status:  employed full-time (Pt works in sales.)  Source of Income:  salary/wages  Financial Environmental Concerns:  none  Current Hobbies:  family functions  Barriers in Personal Life:  mental health concerns, emotional concerns    Significant Clinical History  Current Anxiety Symptoms:  anxious  Current Depression/Trauma:  negativistic, crying or feels like crying, low self esteem, sadness  Current Somatic Symptoms:  anxious  Current Psychosis/Thought Disturbance:     Current Eating Symptoms:  loss of appetite  Chemical Use History:  Alcohol: Binge  Last Use::  (unknown last date of use)  Benzodiazepines: None  Opiates: None  Cocaine: None  Marijuana: None  Other Use: None   Past diagnosis:  No known past diagnosis  Family history:  No known history of mental health or chemical health  concerns  Past treatment:  Individual therapy, Primary Care  Details of most recent treatment:  Pt currently sees her OBGYN for medication management.  Other relevant history:  No other relevant history.    Have there been any medication changes in the past two weeks:  no       Is the patient compliant with medications:  yes        Collateral Information  Is there collateral information: Yes     Collateral information name, relationship, phone number:  Alfred, ex-, PH: [phone]    What happened today: Alfred is unsure what specifically brought pt to the hospital now.     What is different about patient's functioning: Recently, pt has been reporting that she is depressed and has a lot on her mind. She has been napping a lot, crying frequently, and lethargic. She has said she is concerned about her curent alcohol use. No concerns for SI.     What do you think the patient needs: Pt needs support around her mental health.      Has patient made comments about wanting to kill themselves/others: no    If d/c is recommended, can they take part in safety/aftercare planning:  yes    Additional collateral information:  No additional collateral information.     Risk Assessment  [place] Suicide Severity Rating Scale Full Clinical Version: 5/17/25  Suicidal Ideation  Q1 Wish to be Dead (Lifetime): No  Q2 Non-Specific Active Suicidal Thoughts (Lifetime): No  Q6 Suicide Behavior (Lifetime): no  Intensity of Ideation (Lifetime)  Most Severe Ideation Rating (Lifetime):  (0)  Frequency (Lifetime):  (0)  Duration (Lifetime):  (0)  Controllability (Lifetime):  (0)  Deterrents (Lifetime):  (0)  Reasons for Ideation (Lifetime):  (0)  Suicidal Behavior (Lifetime)  Actual Attempt (Lifetime): No  Has subject engaged in non-suicidal self-injurious behavior? (Lifetime): No  Interrupted Attempts (Lifetime): No  Aborted or Self-Interrupted Attempt (Lifetime): No  Preparatory Acts or Behavior (Lifetime): No    [place] Suicide Severity  Rating Scale Recent: 5/17/25  Suicidal Ideation (Recent)  Q1 Wished to be Dead (Past Month): no  Q2 Suicidal Thoughts (Past Month): no  Level of Risk per Screen: no risks indicated  Intensity of Ideation (Recent)  Most Severe Ideation Rating (Past 1 Month):  (0)  Frequency (Past 1 Month):  (0)  Duration (Past 1 Month):  (0)  Controllability (Past 1 Month):  (0)  Deterrents (Past 1 Month):  (0)  Reasons for Ideation (Past 1 Month):  (0)  Suicidal Behavior (Recent)  Actual Attempt (Past 3 Months): No  Total Number of Actual Attempts (Past 3 Months): 0  Has subject engaged in non-suicidal self-injurious behavior? (Past 3 Months): No  Interrupted Attempts (Past 3 Months): No  Total Number of Interrupted Attempts (Past 3 Months): 0  Aborted or Self-Interrupted Attempt (Past 3 Months): No  Total Number of Aborted or Self-Interrupted Attempts (Past 3 Months): 0  Preparatory Acts or Behavior (Past 3 Months): No  Total Number of Preparatory Acts (Past 3 Months): 0    Environmental or Psychosocial Events: other life stressors, ongoing abuse of substances  Protective Factors: Protective Factors: strong bond to family unit, community support, or employment, responsibilities and duties to others, including pets and children, lives in a responsibly safe and stable environment, sense of importance of health and wellness, able to access care without barriers, supportive ongoing medical and mental health care relationships, help seeking, good treatment engagement    Does the patient have thoughts of harming others? Feels Like Hurting Others: no  Previous Attempt to Hurt Others: no  Is the patient engaging in sexually inappropriate behavior?: no  Does Patient have a known history of aggressive behavior: No  Has aggression occurred as a result of MH concerns/diagnosis: No.  Does patient have history of aggression in hospital: No.    Is the patient engaging in sexually inappropriate behavior?  no        Mental Status Exam   Affect:  Blunted  Appearance: Appropriate  Attention Span/Concentration: Attentive  Eye Contact: Engaged    Fund of Knowledge: Appropriate   Language /Speech Content: Fluent  Language /Speech Volume: Normal  Language /Speech Rate/Productions: Normal  Recent Memory: Intact  Remote Memory: Intact  Mood: Anxious, Depressed, Sad  Orientation to Person: Yes   Orientation to Place: Yes  Orientation to Time of Day: Yes  Orientation to Date: Yes     Situation (Do they understand why they are here?): Yes  Psychomotor Behavior: Normal  Thought Content: Clear  Thought Form: Obsessive/Perseverative     Medication  Psychotropic medications:   Medication Orders - Psychiatric (From admission, onward)      None             Current Care Team  Patient Care Team:  No Ref-Primary, Physician as PCP - General    Diagnosis  Patient Active Problem List   Diagnosis Code    Major depressive disorder, recurrent episode, moderate (H) F33.1       Primary Problem This Admission  Active Hospital Problems    Major depressive disorder, recurrent episode, moderate (H) F33.1    Clinical Summary and Substantiation of Recommendations   Clinical Substantiation:  It is the recommendation of this writer that pt discharge to follow-up with outpatient supports, including referrals for therapy, psychiatry, and IOP. Pt reports an increase in anxiety and depression over the last year but denies suicidal ideation, including thoughts of wanting to fall asleep and not wake-up. Pt is requesting to discharge home.    Goals for crisis stabilization:  coordinate discharge    Next steps for Care Team:  coordinate discharge    Treatment Objectives Addressed:  processing feelings, assessing safety    Therapeutic Interventions:  Engaged in guided discovery, explored patient's perspectives and helped expand them through socratic dialogue.    Has a specific means been identified for suicidal/homicide actions: No    Patient coping skills attempted to reduce the crisis:  Pt has been  reaching out for support from her ex-.    Disposition  Recommended referrals: Individual Therapy, Medication Management, Programmatic Care        Reviewed case and recommendations with attending provider. Attending Name: attending provider has not yet seen pt       Attending concurs with disposition:  (attending provider has not yet seen pt)       Patient and/or validated legal guardian concurs with disposition:   yes       Final disposition:  discharge    Legal status: Voluntary/Patient has signed consent for treatment                                                  Reviewed court records: yes       Assessment Details   Total duration spent with the patient: 20 min     CPT code(s) utilized: 27726 - Psychotherapy (with patient) - 30 (16-37*) min    LUÍS Borja, Psychotherapist  [facility] - Triage & Transition Services  Callback: [phone]

## 2025-05-18 NOTE — PLAN OF CARE
Angeli Blancas  May 17, 2025  Plan of Care Hand-off Note     Patient Recommended Care Path: discharge    Clinical Substantiation:  It is the recommendation of this writer that pt discharge to follow-up with outpatient supports, including referrals for therapy, psychiatry, and IOP. Pt reports an increase in anxiety and depression over the last year but denies suicidal ideation, including thoughts of wanting to fall asleep and not wake-up. Pt is requesting to discharge home.    Goals for crisis stabilization:  coordinate discharge    Next steps for Care Team:  coordinate discharge    Treatment Objectives Addressed:  processing feelings, assessing safety    Therapeutic Interventions:  Engaged in guided discovery, explored patient's perspectives and helped expand them through socratic dialogue.    Has a specific means been identified for suicidal.homicide actions: No    Patient coping skills attempted to reduce the crisis:  Pt has been reaching out for support from her ex-.    Collateral contact information:  Alfred ex-, PH: [phone]    Legal Status: Voluntary/Patient has signed consent for treatment                                                   Reviewed court records: yes     Psychiatry Consult: No psychiatry consult needed. Pt is discharging.    Yelitza Escobedo, [facility] CenterSW

## 2025-05-19 ENCOUNTER — PATIENT OUTREACH (OUTPATIENT)
Dept: CARE COORDINATION | Facility: CLINIC | Age: 45
End: 2025-05-19
Payer: COMMERCIAL

## 2025-05-27 ENCOUNTER — TELEPHONE (OUTPATIENT)
Dept: BEHAVIORAL HEALTH | Facility: CLINIC | Age: 45
End: 2025-05-27
Payer: COMMERCIAL

## 2025-05-27 NOTE — TELEPHONE ENCOUNTER
This patient was a no call/no show to her 3:00 pm substance use disorder assessment appointment today on 5/27/2025.  The patient had not arrived to [facility] for the appointment as of 3:20 pm.  The patient should be directed to call the [facility] Intake department at [phone] to re-schedule the substance use disorder assessment as needed.